# Patient Record
Sex: FEMALE | Race: WHITE | NOT HISPANIC OR LATINO | Employment: UNEMPLOYED | URBAN - METROPOLITAN AREA
[De-identification: names, ages, dates, MRNs, and addresses within clinical notes are randomized per-mention and may not be internally consistent; named-entity substitution may affect disease eponyms.]

---

## 2018-07-11 ENCOUNTER — HOSPITAL ENCOUNTER (EMERGENCY)
Facility: HOSPITAL | Age: 53
Discharge: HOME | End: 2018-07-11
Attending: EMERGENCY MEDICINE
Payer: COMMERCIAL

## 2018-07-11 VITALS
WEIGHT: 192 LBS | HEART RATE: 97 BPM | RESPIRATION RATE: 19 BRPM | SYSTOLIC BLOOD PRESSURE: 111 MMHG | HEIGHT: 66 IN | TEMPERATURE: 98.2 F | DIASTOLIC BLOOD PRESSURE: 70 MMHG | OXYGEN SATURATION: 94 % | BODY MASS INDEX: 30.86 KG/M2

## 2018-07-11 DIAGNOSIS — J44.1 COPD WITH ACUTE EXACERBATION (HCC): Primary | ICD-10-CM

## 2018-07-11 PROCEDURE — 94644 CONT INHLJ TX 1ST HOUR: CPT

## 2018-07-11 PROCEDURE — 94640 AIRWAY INHALATION TREATMENT: CPT

## 2018-07-11 PROCEDURE — 94760 N-INVAS EAR/PLS OXIMETRY 1: CPT

## 2018-07-11 PROCEDURE — 99283 EMERGENCY DEPT VISIT LOW MDM: CPT

## 2018-07-11 RX ORDER — PREDNISONE 20 MG/1
TABLET ORAL
Qty: 24 TABLET | Refills: 0 | Status: SHIPPED | OUTPATIENT
Start: 2018-07-11 | End: 2021-07-21 | Stop reason: SDUPTHER

## 2018-07-11 RX ORDER — ALBUTEROL SULFATE 2.5 MG/3ML
5 SOLUTION RESPIRATORY (INHALATION) ONCE
Status: COMPLETED | OUTPATIENT
Start: 2018-07-11 | End: 2018-07-11

## 2018-07-11 RX ORDER — CETIRIZINE HYDROCHLORIDE 10 MG/1
10 TABLET ORAL DAILY
Qty: 30 TABLET | Refills: 0 | Status: SHIPPED | OUTPATIENT
Start: 2018-07-11 | End: 2020-06-03

## 2018-07-11 RX ORDER — SODIUM CHLORIDE FOR INHALATION 0.9 %
3 VIAL, NEBULIZER (ML) INHALATION ONCE
Status: COMPLETED | OUTPATIENT
Start: 2018-07-11 | End: 2018-07-11

## 2018-07-11 RX ORDER — PREDNISONE 20 MG/1
60 TABLET ORAL ONCE
Status: COMPLETED | OUTPATIENT
Start: 2018-07-11 | End: 2018-07-11

## 2018-07-11 RX ADMIN — ISODIUM CHLORIDE 3 ML: 0.03 SOLUTION RESPIRATORY (INHALATION) at 11:45

## 2018-07-11 RX ADMIN — ALBUTEROL SULFATE 5 MG: 2.5 SOLUTION RESPIRATORY (INHALATION) at 10:55

## 2018-07-11 RX ADMIN — IPRATROPIUM BROMIDE 1 MG: 0.5 SOLUTION RESPIRATORY (INHALATION) at 11:45

## 2018-07-11 RX ADMIN — ALBUTEROL SULFATE 10 MG: 2.5 SOLUTION RESPIRATORY (INHALATION) at 11:45

## 2018-07-11 RX ADMIN — IPRATROPIUM BROMIDE 0.5 MG: 0.5 SOLUTION RESPIRATORY (INHALATION) at 10:55

## 2018-07-11 RX ADMIN — PREDNISONE 60 MG: 20 TABLET ORAL at 11:39

## 2018-07-11 NOTE — DISCHARGE INSTRUCTIONS
COPD (Chronic Obstructive Pulmonary Disease)   WHAT YOU NEED TO KNOW:   Chronic obstructive pulmonary disease (COPD) is a lung disease that makes it hard for you to breathe  It is usually a result of lung damage caused by years of irritation and inflammation in your lungs  DISCHARGE INSTRUCTIONS:   Call 911 if:   · You feel lightheaded, short of breath, and have chest pain  Return to the emergency department if:   · You are confused, dizzy, or feel faint  · Your arm or leg feels warm, tender, and painful  It may look swollen and red  · You cough up blood  Contact your healthcare provider if:   · You have more shortness of breath than usual      · You need more medicine than usual to control your symptoms  · You are coughing or wheezing more than usual      · You are coughing up more mucus, or it is a different color or has a different odor  · You gain more than 3 pounds in a week  · You have a fever, a runny or stuffy nose, and a sore throat, or other cold or flu symptoms  · Your skin, lips, or nails start to turn blue  · You have swelling in your legs or ankles  · You are very tired or weak for more than a day  · You notice changes in your mood, or changes in your ability to think or concentrate  · You have questions or concerns about your condition or care  Medicines:   · Medicines  may be used to open your airways, decrease swelling and inflammation in your lungs, or treat an infection  You may need 2 or more medicines  A short-acting medicine relieves symptoms quickly  Long-acting medicines will control or prevent symptoms  Ask for more information about the medicines you are given and how to use them safely  · Take your medicine as directed  Contact your healthcare provider if you think your medicine is not helping or if you have side effects  Tell him or her if you are allergic to any medicine  Keep a list of the medicines, vitamins, and herbs you take  Include the amounts, and when and why you take them  Bring the list or the pill bottles to follow-up visits  Carry your medicine list with you in case of an emergency  Help make breathing easier:   · Use pursed-lip breathing any time you feel short of breath  Take a deep breath in through your nose  Slowly breathe out through your mouth with your lips pursed for twice as long as you inhaled  You can also practice this breathing pattern while you bend, lift, climb stairs, or exercise  It slows down your breathing and helps move more air in and out of your lungs  · Do not smoke, and avoid others who smoke  Nicotine and other substances can cause lung irritation or damage and make it harder for you to breathe  Do not use e-cigarettes or smokeless tobacco  They still contain nicotine  Ask your healthcare provider for information if you currently smoke and need help to quit  For support and more information:  ¨ [place]  Phone: 9- 903 - [phone]  Web Address: [employer]      · Be aware of and avoid anything that makes your symptoms worse  Stay out of high altitudes and places with high humidity  Stay inside, or cover your mouth and nose with a scarf when you are outside during cold weather  Stay inside on days when air pollution or pollen counts are high  Do not use aerosol sprays such as deodorant, bug spray, and hair spray  Manage COPD and help prevent exacerbations:  COPD is a serious condition that gets worse over time  A COPD exacerbation means your symptoms suddenly get worse  It is important to prevent exacerbations  An exacerbation can cause more lung damage  COPD cannot be cured, but you can take action to feel better and prevent COPD exacerbations:  · Protect yourself from germs  Germs can get into your lungs and cause an infection  An infection in your lungs can create more mucus and make it harder to breathe   An infection can also create swelling in your airways and prevent air from getting in  You can decrease your risk for infection by doing the following:     [facility] your hands often with soap and water  Carry germ-killing gel with you  You can use the gel to clean your hands when soap and water are not available  ¨ Do not touch your eyes, nose, or mouth unless you have washed your hands first      ¨ Always cover your mouth when you cough  Cough into a tissue or your shirtsleeve so you do not spread germs from your hands  ¨ Try to avoid people who have a cold or the flu  If you are sick, stay away from others as much as possible  · Drink more liquids  This will help to keep your air passages moist and help you cough up mucus  Ask how much liquid to drink each day and which liquids are best for you  · Exercise daily  Exercise for at least 20 minutes each day to help increase your energy and decrease shortness of breath  Walking or riding a bike are good ways to exercise  Talk to your healthcare provider about the best exercise plan for you  · Ask about vaccines  Your healthcare provider may recommend that you get regular flu and pneumonia vaccines  Pneumonia can become life-threatening for a person who has COPD  Ask about other vaccines you may need  Ask your healthcare provider about the flu and pneumonia vaccines  All adults should get the flu (influenza) vaccine every year as soon as it becomes available  The pneumonia vaccine is given to adults aged 72 or older to prevent pneumococcal disease, such as pneumonia  Adults aged 23 to 59 years who are at high risk for pneumococcal disease also should get the pneumococcal vaccine  It may need to be repeated 1 or 5 years later  Pulmonary rehabilitation:  Your healthcare provider may recommend a program to help you manage your symptoms and improve your quality of life  It may include nutritional counseling and exercise to strengthen your lungs     Make decisions about your choices for future treatment:  Ask for information about advanced medical directives and living [place]  These documents help you decide and write down your choices for treatment and end-of-life care  It is best to complete them when you feel well and can think clearly about your wishes  The information can then be kept for future use if you are in the hospital or become very ill  Follow up with your healthcare provider as directed: You may need more tests  Your healthcare provider may refer you to a pulmonary (lung) specialist  Write down your questions so you remember to ask them during your visits  © 2017 [facility] Information is for End User's use only and may not be sold, redistributed or otherwise used for commercial purposes  All illustrations and images included in CareNotes® are the copyrighted property of A D A M , Inc  or Gregory Kolb  The above information is an  only  It is not intended as medical advice for individual conditions or treatments  Talk to your doctor, nurse or pharmacist before following any medical regimen to see if it is safe and effective for you

## 2018-07-11 NOTE — ED PROVIDER NOTES
History  Chief Complaint   Patient presents with    URI     Pt c/o URI with cough and clear sputum  Pt is also c/o wheezing     71-year-old female with underlying history of COPD presenting here after visiting urgent care and being referred here  She believes her COPD is most likely exacerbated by the current environment  She is thinking that she may have seasonal allergies that are contributing  She was at urgent care today and was told to come here because we have stronger steroids  Patient is using moderate accessory muscles she is able to speak a few words at a time,  Having moderate severe wheezing        URI   Presenting symptoms: no congestion, no cough, no ear pain, no fatigue, no fever, no rhinorrhea and no sore throat    Associated symptoms: no arthralgias, no headaches, no myalgias and no wheezing        None       Past Medical History:   Diagnosis Date    Bronchitis     COPD (chronic obstructive pulmonary disease) (HCC)     Hyperlipidemia     Hypertension        Past Surgical History:   Procedure Laterality Date    HYSTERECTOMY         History reviewed  No pertinent family history  I have reviewed and agree with the history as documented  Social History   Substance Use Topics    Smoking status: Current Every Day Smoker     Packs/day: 1 00     Types: Cigarettes    Smokeless tobacco: Never Used    Alcohol use Yes      Comment: social        Review of Systems   Constitutional: Negative for activity change, fatigue and fever  HENT: Negative for congestion, ear pain, rhinorrhea and sore throat  Eyes: Negative  Respiratory: Negative for cough, shortness of breath and wheezing  Gastrointestinal: Negative for abdominal pain, diarrhea, nausea and vomiting  Endocrine: Negative  Genitourinary: Negative for difficulty urinating, dyspareunia, dysuria, flank pain, frequency, menstrual problem, pelvic pain, urgency, vaginal bleeding, vaginal discharge and vaginal pain  Musculoskeletal: Negative for arthralgias and myalgias  Skin: Negative for color change and pallor  Neurological: Negative for dizziness, speech difficulty, weakness and headaches  Hematological: Negative for adenopathy  Psychiatric/Behavioral: Negative for confusion  Physical Exam  Physical Exam   Constitutional: She is oriented to person, place, and time  She appears well-developed and well-nourished  She is cooperative  Non-toxic appearance  She does not have a sickly appearance  She does not appear ill  No distress  HENT:   Head: Normocephalic and atraumatic  Right Ear: Tympanic membrane and external ear normal    Left Ear: Tympanic membrane and external ear normal    Nose: No rhinorrhea, sinus tenderness or nasal deformity  No epistaxis  Right sinus exhibits no maxillary sinus tenderness and no frontal sinus tenderness  Left sinus exhibits no maxillary sinus tenderness and no frontal sinus tenderness  Mouth/Throat: Oropharynx is clear and moist and mucous membranes are normal  Normal dentition  Eyes: EOM are normal  Pupils are equal, round, and reactive to light  Neck: Normal range of motion  Neck supple  Cardiovascular: Normal rate, regular rhythm and normal heart sounds  No murmur heard  Pulmonary/Chest: No accessory muscle usage  She is in respiratory distress  She has wheezes  She has no rales  She exhibits no tenderness  Abdominal: Soft  She exhibits no distension  There is no guarding  Musculoskeletal: Normal range of motion  She exhibits no edema or tenderness  Lymphadenopathy:     She has no cervical adenopathy  Neurological: She is alert and oriented to person, place, and time  She exhibits normal muscle tone  Skin: Skin is warm and dry  No rash noted  No erythema  Psychiatric: She has a normal mood and affect  Nursing note and vitals reviewed        Vital Signs  ED Triage Vitals [07/11/18 1044]   Temperature Pulse Respirations Blood Pressure SpO2   98 2 °F (36 8 °C) 92 22 132/82 95 %      Temp Source Heart Rate Source Patient Position - Orthostatic VS BP Location FiO2 (%)   Oral Monitor Sitting Right arm --      Pain Score       No Pain           Vitals:    07/11/18 1044 07/11/18 1225   BP: 132/82 111/70   Pulse: 92 97   Patient Position - Orthostatic VS: Sitting Lying       Visual Acuity      ED Medications  Medications   albuterol inhalation solution 5 mg (5 mg Nebulization Given 7/11/18 1055)   ipratropium (ATROVENT) 0 02 % inhalation solution 0 5 mg (0 5 mg Nebulization Given 7/11/18 1055)   albuterol inhalation solution 10 mg (10 mg Nebulization Given 7/11/18 1145)   ipratropium (ATROVENT) 0 02 % inhalation solution 1 mg (1 mg Nebulization Given 7/11/18 1145)   sodium chloride 0 9 % inhalation solution 3 mL (3 mL Nebulization Given 7/11/18 1145)   predniSONE tablet 60 mg (60 mg Oral Given 7/11/18 1139)       Diagnostic Studies  Results Reviewed     None                 No orders to display              Procedures  Procedures       Phone Contacts  ED Phone Contact    ED Course                               MDM  Number of Diagnoses or Management Options  COPD with acute exacerbation [facility]): new and requires workup  Diagnosis management comments: Patient feels significantly better after hour long breathing treatment  She states she is ready for discharge home  Amount and/or Complexity of Data Reviewed  Independent visualization of images, tracings, or specimens: yes    Patient Progress  Patient progress: improved    CritCare Time    Disposition  Final diagnoses:   COPD with acute exacerbation ([address] )     Time reflects when diagnosis was documented in both MDM as applicable and the Disposition within this note     Time User Action Codes Description Comment    7/11/2018 12:22 PM Omari Hallman Add [J44 1] COPD with acute exacerbation [facility])       ED Disposition     ED Disposition Condition Comment    Discharge  Leida Hutchinson discharge to home/self care      Condition at discharge: Stable        Follow-up Information     Follow up With Specialties Details Why Contact Info       As needed           Discharge Medication List as of 7/11/2018 12:24 PM      START taking these medications    Details   cetirizine (ZyrTEC) 10 mg tablet Take 1 tablet (10 mg total) by mouth daily for 30 days, Starting Wed 7/11/2018, Until Fri 8/10/2018, Print      ipratropium (ATROVENT) 0 02 % nebulizer solution Take 1 vial (0 5 mg total) by nebulization 3 (three) times a day for 10 days, Starting Wed 7/11/2018, Until Sat 7/21/2018, Print      predniSONE 20 mg tablet Take 60 mg by mouth daily ×4 days, 40 mg by mouth daily ×4 days, 20 mg by mouth daily ×4 days, Print           No discharge procedures on file      ED Provider  Electronically Signed by           KELSEY Steve  07/11/18 1180

## 2018-09-26 ENCOUNTER — EVALUATION (OUTPATIENT)
Dept: PHYSICAL THERAPY | Facility: CLINIC | Age: 53
End: 2018-09-26
Payer: COMMERCIAL

## 2018-09-26 DIAGNOSIS — M54.32 LEFT SIDED SCIATICA: Primary | ICD-10-CM

## 2018-09-26 PROCEDURE — 97535 SELF CARE MNGMENT TRAINING: CPT

## 2018-09-26 PROCEDURE — 97035 APP MDLTY 1+ULTRASOUND EA 15: CPT

## 2018-09-26 PROCEDURE — G8978 MOBILITY CURRENT STATUS: HCPCS

## 2018-09-26 PROCEDURE — 97162 PT EVAL MOD COMPLEX 30 MIN: CPT

## 2018-09-26 PROCEDURE — G8979 MOBILITY GOAL STATUS: HCPCS

## 2018-09-26 RX ORDER — MELOXICAM 15 MG/1
15 TABLET ORAL DAILY
COMMUNITY

## 2018-09-26 RX ORDER — CYCLOBENZAPRINE HCL 10 MG
10 TABLET ORAL 3 TIMES DAILY PRN
COMMUNITY

## 2018-09-26 NOTE — LETTER
2018    Emre Ramsey, [address]    Patient: Sweetie Alan   YOB: 1965   Date of Visit: 2018     Encounter Diagnosis     ICD-10-CM    1  Left sided sciatica M54 32        Dear Dr Bueno Ask:    Please review the attached Plan of Care from [address] [place] [address] recent visit  Please verify that you agree therapy should continue by signing the attached document and sending it back to our office  If you have any questions or concerns, please don't hesitate to call  Sincerely,    Cece Lu, PT      Referring Provider:      I certify that I have read the below Plan of Care and certify the need for these services furnished under this plan of treatment while under my care  Emre Ramsey DO  [address] [facility]: [phone]          PT Evaluation     Today's date: 2018  Patient name: Sweetie Alan  : 1965  MRN: [id number]  Referring provider: Adolfo Peralta DO  Dx:   Encounter Diagnosis     ICD-10-CM    1  Left sided sciatica M54 32                   Assessment  Impairments: abnormal gait, abnormal or restricted ROM, activity intolerance, impaired physical strength, lacks appropriate home exercise program and pain with function    Assessment details: Pt presents with new onset left LBP with pain radiating to LLE  Symptoms onset 3 weeks ago after rotatory movement L-spine region  Reports MRI showed severe spinal stenosis and cyst L3-L4-L5  PT notes limited trunk ROM and strength  Abnormal gait noted due to pain with WB LLE  Pt is to see spine surgeon  Pt will benefit from PT tx to eliminate symptoms and restore normal functional level  Prognosis: good    Goals  ST  Decrease pain 50% 4 wk  2  Increase trunk ROM by 25% 4 wk  3  Increase trunk strength to F/F+  4 wk  4  Increase BLE strength to 4 to 4+/5 4 wk  LT  Pt will report no pain 8 wk  2    Increase trunk ROM to WNL 8 wk  3   Increase trunk strength to WNL 8 wk  4  Pt will report no limitations with ADL's 8 wk  5  Pt will report no limitations with ambulation 8 wk    Plan  Patient would benefit from: PT eval  Planned modality interventions: cryotherapy, thermotherapy: hydrocollator packs, ultrasound and unattended electrical stimulation  Planned therapy interventions: joint mobilization, abdominal trunk stabilization, manual therapy, strengthening, stretching, therapeutic activities, therapeutic exercise, home exercise program, functional ROM exercises and flexibility  Frequency: 3x week  Duration in weeks: 8  Treatment plan discussed with: patient        Subjective Evaluation    History of Present Illness  Mechanism of injury: Pt presents with 3 week history from central aspect of L-spine across left low back and radiating down LLE to near ankle region  Pain intermittent  Worst in the morning  Increased pain with steps  Reports turning on couch with "crunching" in low back was onset for symptoms  Had MRI on 18 and reports severe spinal stenosis found along with cyst L3-L4-L5  Has appt with spine surgeon on 10-16-18  Reports no relief with medications  Ice provides minimal relief  Reports hx chronic LBP with multiple injections and Hx PT tx in past     Pain  Current pain ratin  At best pain ratin  At worst pain rating: 10  Location: Left low back and LLE  Quality: sharp    Life stress: Cares for elderly woman          Objective     Postural Observations    Additional Postural Observation Details  Left lateral trunk lean noted noted seated and standing    Tenderness     Lumbar Spine  Tenderness in the spinous process, facet joint, pedicle and left transverse process  Left Hip   Tenderness in the PSIS       Additional Tenderness Details  Tenderness noted bilateral Lumbar paraspinal mm  Left > Right  Increased tenderness noted left SI jt region    Active Range of Motion     Lumbar   Flexion: WFL  Extension: 5 degrees with pain  Left lateral flexion: 8 degrees with pain  Right lateral flexion: 10 degrees with pain    Additional Active Range of Motion Details  Pain to bilateral gluteal region with extension  Increased pain with left lateral flexion  Tightness/pain with rotation bilaterally  Rotation decreased 50% bilaterally     Strength/Myotome Testing     Left Hip   Planes of Motion   Flexion: 4-  Extension: 4-  Abduction: 4-  Adduction: 4-    Right Hip   Planes of Motion   Flexion: 4-  Extension: 4-  Abduction: 4-  Adduction: 4-    Left Knee   Flexion: 4-  Extension: 4    Right Knee   Flexion: 4-  Extension: 4    Left Ankle/Foot   Dorsiflexion: 4  Plantar flexion: 4    Right Ankle/Foot   Dorsiflexion: 4  Plantar flexion: 4    Additional Strength Details  Fair-/Fair seated resisted trunk strength    Tests     Lumbar     Left   Negative passive SLR  Right   Negative passive SLR  Left Pelvic Girdle/Sacrum   Positive: sacrum compression  Right Pelvic Girdle/Sacrum   Positive: sacrum compression  Additional Tests Details  Decreased BLE mm flexibility noted  No symptoms with SLR bilaterally  No change in symptoms with LE traction    Ambulation     Ambulation: Stairs   Ascend stairs: independent  Pattern: non-reciprocal  Descend stairs: independent  Pattern: non-reciprocal    Additional Stairs Ambulation Details  Reports difficulty with steps due to increased pain with WB LLE    Observational Gait   Gait: antalgic   Decreased walking speed, stride length, left stance time and left step length       Additional Observational Gait Details  Left knee in flexed position with gait      Flowsheet Rows      Most Recent Value   PT/OT G-Codes   Current Score  34   Projected Score  59   Assessment Type  Evaluation   G code set  Mobility: Walking & Moving Around   Mobility: Walking and Moving Around Current Status ()  CL   Mobility: Walking and Moving Around Goal Status ()  CK Precautions:  Latex Allergy    Specialty Daily Treatment Diary     Manual  9-26-18       Stretch BLE  LE traction                                            Exercise Diary  9-26-18       nustep        St lateral trunk stretch        St trunk ext        Self HS stretch        Self piriformis stretch        PPT        bridges        abd crunch        LTR        SKTC        MM energy scissors and ABD                                                                                    Modalities 9-26-18       Ultrasound 1 3 w/cm2  10'       E-stim with JAISON

## 2018-09-26 NOTE — PROGRESS NOTES
PT Evaluation     Today's date: 2018  Patient name: Marilee Eric  : 1965  MRN: [id number]  Referring provider: Pantera Nuno DO  Dx:   Encounter Diagnosis     ICD-10-CM    1  Left sided sciatica M54 32                   Assessment  Impairments: abnormal gait, abnormal or restricted ROM, activity intolerance, impaired physical strength, lacks appropriate home exercise program and pain with function    Assessment details: Pt presents with new onset left LBP with pain radiating to LLE  Symptoms onset 3 weeks ago after rotatory movement L-spine region  Reports MRI showed severe spinal stenosis and cyst L3-L4-L5  PT notes limited trunk ROM and strength  Abnormal gait noted due to pain with WB LLE  Pt is to see spine surgeon  Pt will benefit from PT tx to eliminate symptoms and restore normal functional level  Prognosis: good    Goals  ST  Decrease pain 50% 4 wk  2  Increase trunk ROM by 25% 4 wk  3  Increase trunk strength to F/F+  4 wk  4  Increase BLE strength to 4 to 4+/5 4 wk  LT  Pt will report no pain 8 wk  2  Increase trunk ROM to WNL 8 wk  3  Increase trunk strength to WNL 8 wk  4  Pt will report no limitations with ADL's 8 wk  5    Pt will report no limitations with ambulation 8 wk    Plan  Patient would benefit from: PT eval  Planned modality interventions: cryotherapy, thermotherapy: hydrocollator packs, ultrasound and unattended electrical stimulation  Planned therapy interventions: joint mobilization, abdominal trunk stabilization, manual therapy, strengthening, stretching, therapeutic activities, therapeutic exercise, home exercise program, functional ROM exercises and flexibility  Frequency: 3x week  Duration in weeks: 8  Treatment plan discussed with: patient        Subjective Evaluation    History of Present Illness  Mechanism of injury: Pt presents with 3 week history from central aspect of L-spine across left low back and radiating down LLE to near ankle region  Pain intermittent  Worst in the morning  Increased pain with steps  Reports turning on couch with "crunching" in low back was onset for symptoms  Had MRI on 18 and reports severe spinal stenosis found along with cyst L3-L4-L5  Has appt with spine surgeon on 10-16-18  Reports no relief with medications  Ice provides minimal relief  Reports hx chronic LBP with multiple injections and Hx PT tx in past     Pain  Current pain ratin  At best pain ratin  At worst pain rating: 10  Location: Left low back and LLE  Quality: sharp    Life stress: Cares for elderly woman          Objective     Postural Observations    Additional Postural Observation Details  Left lateral trunk lean noted noted seated and standing    Tenderness     Lumbar Spine  Tenderness in the spinous process, facet joint, pedicle and left transverse process  Left Hip   Tenderness in the PSIS       Additional Tenderness Details  Tenderness noted bilateral Lumbar paraspinal mm  Left > Right  Increased tenderness noted left SI jt region    Active Range of Motion     Lumbar   Flexion: WFL  Extension: 5 degrees with pain  Left lateral flexion: 8 degrees with pain  Right lateral flexion: 10 degrees with pain    Additional Active Range of Motion Details  Pain to bilateral gluteal region with extension  Increased pain with left lateral flexion  Tightness/pain with rotation bilaterally  Rotation decreased 50% bilaterally     Strength/Myotome Testing     Left Hip   Planes of Motion   Flexion: 4-  Extension: 4-  Abduction: 4-  Adduction: 4-    Right Hip   Planes of Motion   Flexion: 4-  Extension: 4-  Abduction: 4-  Adduction: 4-    Left Knee   Flexion: 4-  Extension: 4    Right Knee   Flexion: 4-  Extension: 4    Left Ankle/Foot   Dorsiflexion: 4  Plantar flexion: 4    Right Ankle/Foot   Dorsiflexion: 4  Plantar flexion: 4    Additional Strength Details  Fair-/Fair seated resisted trunk strength    Tests     Lumbar     Left   Negative passive SLR  Right   Negative passive SLR  Left Pelvic Girdle/Sacrum   Positive: sacrum compression  Right Pelvic Girdle/Sacrum   Positive: sacrum compression  Additional Tests Details  Decreased BLE mm flexibility noted  No symptoms with SLR bilaterally  No change in symptoms with LE traction    Ambulation     Ambulation: Stairs   Ascend stairs: independent  Pattern: non-reciprocal  Descend stairs: independent  Pattern: non-reciprocal    Additional Stairs Ambulation Details  Reports difficulty with steps due to increased pain with WB LLE    Observational Gait   Gait: antalgic   Decreased walking speed, stride length, left stance time and left step length       Additional Observational Gait Details  Left knee in flexed position with gait      Flowsheet Rows      Most Recent Value   PT/OT G-Codes   Current Score  34   Projected Score  59   Assessment Type  Evaluation   G code set  Mobility: Walking & Moving Around   Mobility: Walking and Moving Around Current Status ()  CL   Mobility: Walking and Moving Around Goal Status ()  CK            Precautions:  Latex Allergy    Specialty Daily Treatment Diary     Manual  9-26-18       Stretch BLE  LE traction                                            Exercise Diary  9-26-18       nustep        St lateral trunk stretch        St trunk ext        Self HS stretch        Self piriformis stretch        PPT        bridges        abd crunch        LTR        SKTC        MM energy scissors and ABD                                                                                    Modalities 9-26-18       Ultrasound 1 3 w/cm2  10'       E-stim with P

## 2018-09-27 ENCOUNTER — OFFICE VISIT (OUTPATIENT)
Dept: PHYSICAL THERAPY | Facility: CLINIC | Age: 53
End: 2018-09-27
Payer: COMMERCIAL

## 2018-09-27 DIAGNOSIS — M54.32 LEFT SIDED SCIATICA: Primary | ICD-10-CM

## 2018-09-27 PROCEDURE — 97140 MANUAL THERAPY 1/> REGIONS: CPT

## 2018-09-27 PROCEDURE — 97110 THERAPEUTIC EXERCISES: CPT

## 2018-09-27 NOTE — PROGRESS NOTES
Daily Note     Today's date: 2018  Patient name: Shyam Figueroa  : 1965  MRN: [id number]  Referring provider: Tri Walker DO  Dx:   Encounter Diagnosis     ICD-10-CM    1  Left sided sciatica M54 32                   Subjective: The patient states that she is doing alright  Reports doing a lot of walking today  Objective: See treatment diary below      Assessment: Tolerated treatment well  Patient exhibited good technique with therapeutic exercises and would benefit from continued PT  Patient did have some tightness in her left LE  Plan: Continue per plan of care       Precautions:  Latex Allergy     Specialty Daily Treatment Diary      Manual  18         Stretch BLE  LE traction    15'                                                                       Exercise Diary  18         nustep    L 1 10'         St lateral trunk stretch             St trunk ext             Self HS stretch             Self piriformis stretch             PPT    30x :05         bridges    20x         abd crunch    20x         LTR    :05 x 20         SKTC    :05 20x         MM energy scissors and ABD    Performed                                                                                                                                             Modalities 18         Ultrasound 1 3 w/cm2  10'  Declined         E-stim with MHP    Declined

## 2018-10-02 ENCOUNTER — OFFICE VISIT (OUTPATIENT)
Dept: PHYSICAL THERAPY | Facility: CLINIC | Age: 53
End: 2018-10-02
Payer: COMMERCIAL

## 2018-10-02 DIAGNOSIS — M54.32 LEFT SIDED SCIATICA: Primary | ICD-10-CM

## 2018-10-02 PROCEDURE — 97035 APP MDLTY 1+ULTRASOUND EA 15: CPT

## 2018-10-02 PROCEDURE — 97110 THERAPEUTIC EXERCISES: CPT

## 2018-10-02 PROCEDURE — 97140 MANUAL THERAPY 1/> REGIONS: CPT

## 2018-10-02 NOTE — PROGRESS NOTES
Daily Note     Today's date: 10/2/2018  Patient name: Maylin Jackson  : 1965  MRN: [id number]  Referring provider: Trell Anderson DO  Dx:   Encounter Diagnosis     ICD-10-CM    1  Left sided sciatica M54 32                   Subjective: The patient states that she is pretty sore today  Pain is a 8/10  Objective: See treatment diary below      Assessment: Tolerated treatment fair  Patient exhibited good technique with therapeutic exercises and would benefit from continued PT  Patient did report feeling better after US was performed  Plan: Continue per plan of care       Precautions:  Latex Allergy     Specialty Daily Treatment Diary      Manual  9-26-18  9/27/18 10/2/18       Stretch BLE  LE traction    15'  15'                                                                     Exercise Diary  9-26-18  9/27/18  10/2/18       nustep    L 1 10'  L 2 10'       St lateral trunk stretch             St trunk ext             Self HS stretch             Self piriformis stretch             PPT    30x :05  30x :05       bridges    20x  30x       abd crunch    20x  30x       LTR    :05 x 20  30x :05       SKTC    :05 20x  30x :05       MM energy scissors and ABD    Performed  Performed                                                                                                                                           Modalities 9-26-18  9/27/18  10/2/18       Ultrasound 1 3 w/cm2  10'  Declined  10'       E-stim with MHP    Declined

## 2018-10-03 ENCOUNTER — OFFICE VISIT (OUTPATIENT)
Dept: PHYSICAL THERAPY | Facility: CLINIC | Age: 53
End: 2018-10-03
Payer: COMMERCIAL

## 2018-10-03 DIAGNOSIS — M54.32 LEFT SIDED SCIATICA: Primary | ICD-10-CM

## 2018-10-03 PROCEDURE — 97140 MANUAL THERAPY 1/> REGIONS: CPT

## 2018-10-03 PROCEDURE — 97035 APP MDLTY 1+ULTRASOUND EA 15: CPT

## 2018-10-03 PROCEDURE — 97110 THERAPEUTIC EXERCISES: CPT

## 2018-10-03 NOTE — PROGRESS NOTES
Daily Note     Today's date: 10/3/2018  Patient name: Andra Rey  : 1965  MRN: [id number]  Referring provider: Maximiliano Guillaume DO  Dx:   Encounter Diagnosis     ICD-10-CM    1  Left sided sciatica M54 32                   Subjective: "Im good"        Objective: See treatment diary below   Pain in 7/10    Assessment: Tolerated treatment well  Patient exhibited good technique with therapeutic exercises    Patient did report some minimal improvement since IE  Plan: Continue per plan of care       recautions:  Latex Allergy     Specialty Daily Treatment Diary      Manual  9-26-18  9/27/18 10/2/18  10/3/18     Stretch BLE  LE traction    15'  15'  15                                                                   Exercise Diary  9-26-18  9/27/18  10/2/18  10/3/18     nustep    L 1 10'  L 2 10' L2 10     St lateral trunk stretch             St trunk ext             Self HS stretch             Self piriformis stretch             PPT    30x :05  30x :05  30  5"     bridges    20x  30x  30     abd crunch    20x  30x  30x     LTR    :05 x 20  30x :05  30x     SKTC    :05 20x  30x :05  30x  5'     MM energy scissors and ABD    Performed  Performed  performed                                                                                                                                         Modalities 9-26-18  9/27/18  10/2/18  10/3/18     Ultrasound 1 3 w/cm2  10'  Declined  10'  10     E-stim with MHP    Declined

## 2018-10-04 ENCOUNTER — APPOINTMENT (OUTPATIENT)
Dept: PHYSICAL THERAPY | Facility: CLINIC | Age: 53
End: 2018-10-04
Payer: COMMERCIAL

## 2018-10-05 ENCOUNTER — OFFICE VISIT (OUTPATIENT)
Dept: PHYSICAL THERAPY | Facility: CLINIC | Age: 53
End: 2018-10-05
Payer: COMMERCIAL

## 2018-10-05 DIAGNOSIS — M54.32 LEFT SIDED SCIATICA: Primary | ICD-10-CM

## 2018-10-05 PROCEDURE — 97035 APP MDLTY 1+ULTRASOUND EA 15: CPT

## 2018-10-05 PROCEDURE — 97110 THERAPEUTIC EXERCISES: CPT

## 2018-10-05 PROCEDURE — 97140 MANUAL THERAPY 1/> REGIONS: CPT

## 2018-10-05 NOTE — PROGRESS NOTES
Daily Note     Today's date: 10/5/2018  Patient name: Fantasma tSark  : 1965  MRN: [id number]  Referring provider: Vik Lester DO  Dx:   Encounter Diagnosis     ICD-10-CM    1  Left sided sciatica M54 32                   Subjective:  I was sore yesterday but am feeling better today      Objective: See treatment diary below      Assessment: Tolerated treatment well  No significant symptoms noted with TE  Improved tolerance to manual therapy noted  MM energy held due to no leg length difference noted  Patient would benefit from continued PT      Plan: Continue per plan of care         recautions:  Latex Allergy     Specialty Daily Treatment Diary      Manual  9-26-18  9/27/18 10/2/18  10/3/18  10-5-18   Stretch BLE  LE traction    15'  15'  15  15'                                                                 Exercise Diary  9-26-18  9/27/18  10/2/18  10/3/18  10-5-18   nustep    L 1 10'  L 2 10' L2 10 L3  12'   St lateral trunk stretch             St trunk ext             Self HS stretch             Self piriformis stretch             PPT    30x :05  30x :05  30  5"  20x  5"   bridges    20x  30x  30  20x   abd crunch    20x  30x  30x  20x   LTR    :05 x 20  30x :05  30x  20x  5"   SKTC    :05 20x  30x :05  30x  5'  20x  5"   MM energy scissors and ABD    Performed  Performed  performed      PPU         10x  5"                                                                                                                         Modalities 9-26-18  9/27/18  10/2/18  10/3/18  10-5-18   Ultrasound 1 3 w/cm2  10'  Declined  10'  10 10'   E-stim with MHP    Declined

## 2018-10-08 ENCOUNTER — OFFICE VISIT (OUTPATIENT)
Dept: PHYSICAL THERAPY | Facility: CLINIC | Age: 53
End: 2018-10-08
Payer: COMMERCIAL

## 2018-10-08 DIAGNOSIS — M54.32 LEFT SIDED SCIATICA: Primary | ICD-10-CM

## 2018-10-08 PROCEDURE — 97035 APP MDLTY 1+ULTRASOUND EA 15: CPT

## 2018-10-08 PROCEDURE — 97110 THERAPEUTIC EXERCISES: CPT

## 2018-10-08 PROCEDURE — 97140 MANUAL THERAPY 1/> REGIONS: CPT

## 2018-10-08 NOTE — PROGRESS NOTES
Daily Note     Today's date: 10/8/2018  Patient name: Goldy Jimenez  : 1965  MRN: [id number]  Referring provider: Chau Altamirano DO  Dx:   Encounter Diagnosis     ICD-10-CM    1  Left sided sciatica M54 32                   Subjective: " I FEEL THE SAME"        Objective: See treatment diary below    Pain in 5/10    Pain out 0/10      Assessment: Tolerated treatment well  Patient exhibited good technique with therapeutic exercises  Patient reports having no pain following previous session but pain returns in AM       P      Plan: Continue per plan of care         Specialty Daily Treatment Diary      Manual  10/8/18  9/27/18 10/2/18  10/3/18  10-5-18   Stretch BLE  LE traction  15  15'  15'  15  15'                                                                 Exercise Diary  10/8/18  9/27/18  10/2/18  10/3/18  10-5-18   nustep  L3 15  L 1 10'  L 2 10' L2 10 L3  12'   St lateral trunk stretch             St trunk ext             Self HS stretch             Self piriformis stretch             PPT  30  30x :05  30x :05  30  5"  20x  5"   bridges 30  20x  30x  30  20x   abd crunch 30  20x  30x  30x  20x   LTR 30  :05 x 20  30x :05  30x  20x  5"   SKTC 30  :05 20x  30x :05  30x  5'  20x  5"   MM energy scissors and ABD  Performed  Performed  Performed  performed      PPU         10x  5"                                                                                                                         Modalities 10/8/18  9/27/18  10/2/18  10/3/18  10-5-18   Ultrasound 1 3 w/cm2  10'  Declined  10'  10 10'   E-stim with MHP    Declined

## 2018-10-10 ENCOUNTER — OFFICE VISIT (OUTPATIENT)
Dept: PHYSICAL THERAPY | Facility: CLINIC | Age: 53
End: 2018-10-10
Payer: COMMERCIAL

## 2018-10-10 DIAGNOSIS — M54.32 LEFT SIDED SCIATICA: Primary | ICD-10-CM

## 2018-10-10 PROCEDURE — 97035 APP MDLTY 1+ULTRASOUND EA 15: CPT

## 2018-10-10 PROCEDURE — 97110 THERAPEUTIC EXERCISES: CPT

## 2018-10-10 PROCEDURE — 97140 MANUAL THERAPY 1/> REGIONS: CPT

## 2018-10-10 NOTE — PROGRESS NOTES
Daily Note     Today's date: 10/10/2018  Patient name: Frances Welch  : 1965  MRN: [id number]  Referring provider: Kianna Rodrigues DO  Dx:   Encounter Diagnosis     ICD-10-CM    1  Left sided sciatica M54 32                   Subjective: "Im good"        Objective: See treatment diary below   Pain iin 4/10      Assessment: Tolerated treatment well  Patient exhibited good technique with therapeutic exercises    Pt reports improved s/s since IE  Patient states pain with stairs is only limitation  Plan: Continue per plan of care         pecialty Daily Treatment Diary      Manual  10/8/18 10/10/18 10/2/18  10/3/18  10-5-18   Stretch BLE  LE traction  15  15'  15'  15  15'                                                                 Exercise Diary  10/8/18 10/10/18  10/2/18  10/3/18  10-5-18   nustep  L3 15  L 3  15  L 2 10' L2 10 L3  12'   St lateral trunk stretch             St trunk ext             Self HS stretch             Self piriformis stretch             PPT  30  30x :05  30x :05  30  5"  20x  5"   bridges 30 30  30x  30  20x   abd crunch 30 30  30x  30x  20x   LTR 30 30  30x :05  30x  20x  5"   SKTC 30 30  30x :05  30x  5'  20x  5"   MM energy scissors and ABD  Performed  Performed  Performed  performed      PPU         10x  5"                                                                                                                         Modalities 10/8/18 10/10/18  10/2/18  10/3/18  10-5-18   Ultrasound 1 3 w/cm2  10' 1 5   wts/cm  10'  10 10'   E-stim with MHP

## 2018-10-11 ENCOUNTER — APPOINTMENT (OUTPATIENT)
Dept: PHYSICAL THERAPY | Facility: CLINIC | Age: 53
End: 2018-10-11
Payer: COMMERCIAL

## 2018-10-12 ENCOUNTER — OFFICE VISIT (OUTPATIENT)
Dept: PHYSICAL THERAPY | Facility: CLINIC | Age: 53
End: 2018-10-12
Payer: COMMERCIAL

## 2018-10-12 DIAGNOSIS — M54.32 LEFT SIDED SCIATICA: Primary | ICD-10-CM

## 2018-10-12 PROCEDURE — 97110 THERAPEUTIC EXERCISES: CPT

## 2018-10-12 PROCEDURE — G8979 MOBILITY GOAL STATUS: HCPCS

## 2018-10-12 PROCEDURE — G8980 MOBILITY D/C STATUS: HCPCS

## 2018-10-12 PROCEDURE — 97140 MANUAL THERAPY 1/> REGIONS: CPT

## 2018-10-12 NOTE — PROGRESS NOTES
Daily Note     Today's date: 10/12/2018  Patient name: Mir Herrera  : 1965  MRN: [id number]  Referring provider: Gagandeep Herring DO  Dx:   Encounter Diagnosis     ICD-10-CM    1  Left sided sciatica M54 32                   Subjective: The patient states that she is a little sore  Objective: See treatment diary below      Assessment: Tolerated treatment well  Patient exhibited good technique with therapeutic exercises and would benefit from continued PT  Patient had tightness in her B LEs  Plan: Continue per plan of care       pecialty Daily Treatment Diary      Manual  10/8/18 10/10/18 10/12/18  10/3/18  10-5-18   Stretch BLE  LE traction  15  15'  15'  15  15'                                                                 Exercise Diary  10/8/18 10/10/18  10/12/18  10/3/18  10-5-18   nustep  L3 15  L 3  15  L 2 10' L2 10 L3  12'   St lateral trunk stretch             St trunk ext             Self HS stretch             Self piriformis stretch             PPT  30  30x :05  30x :05  30  5"  20x  5"   bridges 30 30  30x  30  20x   abd crunch 30 30  30x  30x  20x   LTR 30 30  30x :05  30x  20x  5"   SKTC 30 30  30x :05  30x  5'  20x  5"   MM energy scissors and ABD  Performed  Performed  Performed  performed      PPU      10x 5"   10x  5"   Alt arm/leg     20x                                                                                                               Modalities 10/8/18 10/10/18  10/12/18  10/3/18  10-5-18   Ultrasound 1 3 w/cm2  10' 1 5   wts/cm Declined  10 10'   E-stim with MHP

## 2018-10-15 ENCOUNTER — APPOINTMENT (OUTPATIENT)
Dept: PHYSICAL THERAPY | Facility: CLINIC | Age: 53
End: 2018-10-15
Payer: COMMERCIAL

## 2018-12-10 NOTE — PROGRESS NOTES
PT DISCHARGE     Today's date: 12/10/2018  Patient name: Aixa García  : 1965  MRN: [id number]  Referring provider: Bora Cespedes DO  Dx:   Encounter Diagnosis     ICD-10-CM    1  Left sided sciatica M54 32        Start Time: 0800  Stop Time: 0900  Total time in clinic (min): 60 minutes    Assessment  Assessment details: Pt presented with new onset left LBP with pain radiating to LLE  Symptoms onset 3 weeks prior after rotatory movement L-spine region  Reports MRI showed severe spinal stenosis and cyst L3-L4-L5  PT noted limited trunk ROM and strength  Abnormal gait noted due to pain with WB LLE  Pt seen for approx 3 weeks in PT  She reported continued varied symptoms  Last attended session was on 10-12-18  She did not return after that time  D/C PT services  Impairments: abnormal gait, abnormal or restricted ROM, activity intolerance, impaired physical strength, lacks appropriate home exercise program and pain with function     Prognosis: good    Goals  ST  Decrease pain 50% 4 wk  2  Increase trunk ROM by 25% 4 wk  3  Increase trunk strength to F/F+  4 wk  4  Increase BLE strength to 4 to 4+/5 4 wk  LT  Pt will report no pain 8 wk  2  Increase trunk ROM to WNL 8 wk  3  Increase trunk strength to WNL 8 wk  4  Pt will report no limitations with ADL's 8 wk  5    Pt will report no limitations with ambulation 8 wk    Plan  Plan details: D/C PT services  Findings per initial evaluation   Patient would benefit from: PT eval  Planned modality interventions: cryotherapy, thermotherapy: hydrocollator packs, ultrasound and unattended electrical stimulation  Planned therapy interventions: joint mobilization, abdominal trunk stabilization, manual therapy, strengthening, stretching, therapeutic activities, therapeutic exercise, home exercise program, functional ROM exercises and flexibility        Subjective Evaluation    History of Present Illness  Mechanism of injury: Pt presents with 3 week history from central aspect of L-spine across left low back and radiating down LLE to near ankle region  Pain intermittent  Worst in the morning  Increased pain with steps  Reports turning on couch with "crunching" in low back was onset for symptoms  Had MRI on 18 and reports severe spinal stenosis found along with cyst L3-L4-L5  Has appt with spine surgeon on 10-16-18  Reports no relief with medications  Ice provides minimal relief  Reports hx chronic LBP with multiple injections and Hx PT tx in past     Pain  Current pain ratin  At best pain ratin  At worst pain rating: 10  Location: Left low back and LLE  Quality: sharp    Life stress: Cares for elderly woman          Objective     Postural Observations    Additional Postural Observation Details  Left lateral trunk lean noted noted seated and standing    Tenderness     Lumbar Spine  Tenderness in the spinous process, facet joint, pedicle and left transverse process  Left Hip   Tenderness in the PSIS       Additional Tenderness Details  Tenderness noted bilateral Lumbar paraspinal mm  Left > Right  Increased tenderness noted left SI jt region    Active Range of Motion     Lumbar   Flexion: WFL  Extension: 5 degrees with pain  Left lateral flexion: 8 degrees with pain  Right lateral flexion: 10 degrees with pain    Additional Active Range of Motion Details  Pain to bilateral gluteal region with extension  Increased pain with left lateral flexion  Tightness/pain with rotation bilaterally  Rotation decreased 50% bilaterally     Strength/Myotome Testing     Left Hip   Planes of Motion   Flexion: 4-  Extension: 4-  Abduction: 4-  Adduction: 4-    Right Hip   Planes of Motion   Flexion: 4-  Extension: 4-  Abduction: 4-  Adduction: 4-    Left Knee   Flexion: 4-  Extension: 4    Right Knee   Flexion: 4-  Extension: 4    Left Ankle/Foot   Dorsiflexion: 4  Plantar flexion: 4    Right Ankle/Foot   Dorsiflexion: 4  Plantar flexion: 4    Additional Strength Details  Fair-/Fair seated resisted trunk strength    Tests     Lumbar     Left   Negative passive SLR  Right   Negative passive SLR  Left Pelvic Girdle/Sacrum   Positive: sacrum compression  Right Pelvic Girdle/Sacrum   Positive: sacrum compression  Additional Tests Details  Decreased BLE mm flexibility noted  No symptoms with SLR bilaterally  No change in symptoms with LE traction    Ambulation     Ambulation: Stairs   Ascend stairs: independent  Pattern: non-reciprocal  Descend stairs: independent  Pattern: non-reciprocal    Additional Stairs Ambulation Details  Reports difficulty with steps due to increased pain with WB LLE    Observational Gait   Gait: antalgic   Decreased walking speed, stride length, left stance time and left step length       Additional Observational Gait Details  Left knee in flexed position with gait      Flowsheet Rows      Most Recent Value   PT/OT G-Codes   Assessment Type  Discharge   G code set  Mobility: Walking & Moving Around   Mobility: Walking and Moving Around Goal Status ()  CK   Mobility: Walking and Moving Around Discharge Status ()  CL

## 2020-03-11 ENCOUNTER — TELEPHONE (OUTPATIENT)
Dept: PAIN MEDICINE | Facility: CLINIC | Age: 55
End: 2020-03-11

## 2020-03-11 NOTE — TELEPHONE ENCOUNTER
Pt called to set up pain management appointment  Has [facility] family plan  Requested of her to obtain insurance referral and call back to schedule  Provided fax# [phone]  Back pain, No PM within 2 years

## 2020-06-03 ENCOUNTER — CONSULT (OUTPATIENT)
Dept: PAIN MEDICINE | Facility: CLINIC | Age: 55
End: 2020-06-03
Payer: COMMERCIAL

## 2020-06-03 VITALS
DIASTOLIC BLOOD PRESSURE: 78 MMHG | HEART RATE: 69 BPM | RESPIRATION RATE: 20 BRPM | SYSTOLIC BLOOD PRESSURE: 119 MMHG | HEIGHT: 66 IN | WEIGHT: 202.4 LBS | BODY MASS INDEX: 32.53 KG/M2

## 2020-06-03 DIAGNOSIS — M54.16 LUMBAR RADICULOPATHY: Primary | ICD-10-CM

## 2020-06-03 DIAGNOSIS — M48.061 SPINAL STENOSIS OF LUMBAR REGION WITHOUT NEUROGENIC CLAUDICATION: ICD-10-CM

## 2020-06-03 PROCEDURE — 99244 OFF/OP CNSLTJ NEW/EST MOD 40: CPT | Performed by: ANESTHESIOLOGY

## 2020-06-03 RX ORDER — ALBUTEROL SULFATE 90 UG/1
AEROSOL, METERED RESPIRATORY (INHALATION)
COMMUNITY
Start: 2020-04-02

## 2020-06-03 RX ORDER — OMEPRAZOLE 40 MG/1
40 CAPSULE, DELAYED RELEASE ORAL
COMMUNITY
Start: 2018-08-20

## 2020-06-03 RX ORDER — ATORVASTATIN CALCIUM 40 MG/1
40 TABLET, FILM COATED ORAL
COMMUNITY
Start: 2018-10-12

## 2020-06-03 RX ORDER — ALBUTEROL SULFATE 2.5 MG/3ML
SOLUTION RESPIRATORY (INHALATION)
COMMUNITY
Start: 2020-04-22

## 2020-06-03 RX ORDER — LOSARTAN POTASSIUM AND HYDROCHLOROTHIAZIDE 12.5; 1 MG/1; MG/1
1 TABLET ORAL
COMMUNITY
Start: 2018-07-09

## 2020-06-23 ENCOUNTER — HOSPITAL ENCOUNTER (OUTPATIENT)
Dept: RADIOLOGY | Facility: CLINIC | Age: 55
Discharge: HOME | End: 2020-06-23
Attending: ANESTHESIOLOGY | Admitting: ANESTHESIOLOGY
Payer: COMMERCIAL

## 2020-06-23 VITALS
TEMPERATURE: 97.2 F | RESPIRATION RATE: 20 BRPM | SYSTOLIC BLOOD PRESSURE: 125 MMHG | DIASTOLIC BLOOD PRESSURE: 83 MMHG | OXYGEN SATURATION: 97 % | HEART RATE: 75 BPM

## 2020-06-23 DIAGNOSIS — M54.16 LUMBAR RADICULOPATHY: ICD-10-CM

## 2020-06-23 PROCEDURE — 64483 NJX AA&/STRD TFRM EPI L/S 1: CPT | Performed by: ANESTHESIOLOGY

## 2020-06-23 PROCEDURE — 64484 NJX AA&/STRD TFRM EPI L/S EA: CPT | Performed by: ANESTHESIOLOGY

## 2020-06-23 RX ORDER — PAPAVERINE HCL 150 MG
20 CAPSULE, EXTENDED RELEASE ORAL ONCE
Status: COMPLETED | OUTPATIENT
Start: 2020-06-23 | End: 2020-06-23

## 2020-06-23 RX ORDER — LIDOCAINE HYDROCHLORIDE 10 MG/ML
5 INJECTION, SOLUTION EPIDURAL; INFILTRATION; INTRACAUDAL; PERINEURAL ONCE
Status: COMPLETED | OUTPATIENT
Start: 2020-06-23 | End: 2020-06-23

## 2020-06-23 RX ORDER — BUPIVACAINE HCL/PF 2.5 MG/ML
5 VIAL (ML) INJECTION ONCE
Status: COMPLETED | OUTPATIENT
Start: 2020-06-23 | End: 2020-06-23

## 2020-06-23 RX ADMIN — IOHEXOL 2 ML: 300 INJECTION, SOLUTION INTRAVENOUS at 08:24

## 2020-06-23 RX ADMIN — Medication 2 ML: at 08:23

## 2020-06-23 RX ADMIN — DEXAMETHASONE SODIUM PHOSPHATE 20 MG: 10 INJECTION, SOLUTION INTRAMUSCULAR; INTRAVENOUS at 08:24

## 2020-06-23 RX ADMIN — LIDOCAINE HYDROCHLORIDE 3 ML: 10 INJECTION, SOLUTION EPIDURAL; INFILTRATION; INTRACAUDAL; PERINEURAL at 08:22

## 2020-06-29 ENCOUNTER — TELEPHONE (OUTPATIENT)
Dept: PAIN MEDICINE | Facility: CLINIC | Age: 55
End: 2020-06-29

## 2020-06-29 NOTE — TELEPHONE ENCOUNTER
Patient   [phone]  Dr Surjit Elder     Patient is calling in requesting a call back  She said that she is in more pain now   Her pain level is a 8/10

## 2020-06-29 NOTE — TELEPHONE ENCOUNTER
S/w pt  S/p TFESI 6/23  C/o mid to upper back and neck pain  Advised pt that TFESI procedure would not have helped neck pain  Pt states lower back and right leg pain still present but improved  Advised pt to give injection 2 weeks for full benefit and SPA will cb in regards to neck step in treating neck pain  please advise

## 2020-07-01 ENCOUNTER — APPOINTMENT (OUTPATIENT)
Dept: RADIOLOGY | Facility: CLINIC | Age: 55
End: 2020-07-01
Payer: COMMERCIAL

## 2020-07-01 ENCOUNTER — OFFICE VISIT (OUTPATIENT)
Dept: PAIN MEDICINE | Facility: CLINIC | Age: 55
End: 2020-07-01
Payer: COMMERCIAL

## 2020-07-01 VITALS
BODY MASS INDEX: 32.47 KG/M2 | SYSTOLIC BLOOD PRESSURE: 96 MMHG | WEIGHT: 202 LBS | DIASTOLIC BLOOD PRESSURE: 67 MMHG | HEART RATE: 88 BPM | RESPIRATION RATE: 18 BRPM | HEIGHT: 66 IN

## 2020-07-01 DIAGNOSIS — M50.120 CERVICAL DISC DISORDER WITH RADICULOPATHY OF MID-CERVICAL REGION: ICD-10-CM

## 2020-07-01 DIAGNOSIS — M50.120 CERVICAL DISC DISORDER WITH RADICULOPATHY OF MID-CERVICAL REGION: Primary | ICD-10-CM

## 2020-07-01 PROCEDURE — 99214 OFFICE O/P EST MOD 30 MIN: CPT | Performed by: ANESTHESIOLOGY

## 2020-07-01 PROCEDURE — 72050 X-RAY EXAM NECK SPINE 4/5VWS: CPT

## 2020-07-01 NOTE — PATIENT INSTRUCTIONS
Neck Exercises   AMBULATORY CARE:   Neck exercises  help reduce neck pain, and improve neck movement and strength  Neck exercises also help prevent long-term neck problems  What you need to know about neck exercises:   · Do the exercises every day,  or as often as directed by your healthcare provider  · Move slowly, gently, and smoothly  Avoid fast or jerky motions  · Stand and sit the way your healthcare provider shows you  Good posture may reduce your neck pain  Check your posture often, even when you are not doing your neck exercises  How to perform neck exercises safely:   · Exercise position:  You may sit or stand while you do neck exercises  Face forward  Your shoulders should be straight and relaxed, with a good posture  · Head tilts, forward and back:  Gently bow your head and try to touch your chin to your chest  Your healthcare provider may tell you to push on the back of your neck to help bow your head  Raise your chin back to the starting position  Tilt your head back as far as possible so you are looking up at the ceiling  Your healthcare provider may tell you to lift your chin to help tilt your head back  Return your head to the starting position  · Head tilts, side to side:  Tilt your head, bringing your ear toward your shoulder  Then tilt your head toward the other shoulder  · Head turns:  Turn your head to look over your shoulder  Tilt your chin down and try to touch it to your shoulder  Do not raise your shoulder to your chin  Face forward again  Do the same on the other side  · Head rolls:  Slowly bring your chin toward your chest  Next, roll your head to the right  Your ear should be positioned over your shoulder  Hold this position for 5 seconds  Roll your head back toward your chest and to the left into the same position  Hold for 5 seconds  Gently roll your head back and around in a clockwise [place] 3 times   Next, move your head in the reverse direction (counterclockwise) in a [place] 3 times  Do not shrug your shoulders upwards while you do this exercise  Contact your healthcare provider if:   · Your pain does not get better, or gets worse  · You have questions or concerns about your condition, care, or exercise program   © 2017 [address] [employer] Information is for End User's use only and may not be sold, redistributed or otherwise used for commercial purposes  All illustrations and images included in CareNotes® are the copyrighted property of [employer]  or Gregory Kolb  The above information is an  only  It is not intended as medical advice for individual conditions or treatments  Talk to your doctor, nurse or pharmacist before following any medical regimen to see if it is safe and effective for you

## 2020-07-01 NOTE — PROGRESS NOTES
Assessment:  1  Cervical disc disorder with radiculopathy of mid-cervical region  Ambulatory referral to Physical Therapy    X-ray cervical spine complete 4 or 5 vw       Plan: This is a 42-year-old female who presents today with neck pain  On physical examination, there is no gross motor deficits appreciated  Sensory testing is intact  No recent imaging of the neck  No recent physical therapy  At this time, I will send her to physical therapy 2-3 times a week x6 weeks duration  Upon completion, patient will present back in the office for reassessment  We may consider trial order imaging study, MRI of the cervical spine a for her neck pain persist   Patient may benefit from a trial of pain intervention  She verbalized understanding  My impressions and treatment recommendations were discussed in detail with the patient who verbalized understanding and had no further questions  Discharge instructions were provided  I personally saw and examined the patient and I agree with the above discussed plan of care  History of Present Illness  Dennis Holley is a 54 y o  female who presents for a follow up office visit in regards to Neck Pain  The patients current symptoms include neck pain  Of note, patient had a left L4, L5 TFESI approximately a week and half ago  Today, patient reports pain which she rates 7/10  No recent imaging of the neck  No recent physical therapy for her neck pain symptoms  I have personally reviewed and/or updated the patient's past medical history, past surgical history, family history, social history, current medications, allergies, and vital signs today  Review of Systems   Constitutional: Negative for fever and unexpected weight change  HENT: Negative for trouble swallowing  Eyes: Negative for visual disturbance  Respiratory: Negative for shortness of breath and wheezing  Cardiovascular: Negative for chest pain and palpitations     Gastrointestinal: Negative for constipation, diarrhea, nausea and vomiting  Endocrine: Negative for cold intolerance, heat intolerance and polydipsia  Genitourinary: Negative for difficulty urinating and frequency  Musculoskeletal: Positive for arthralgias, myalgias, neck pain and neck stiffness  Negative for gait problem and joint swelling  Skin: Negative for rash  Neurological: Positive for numbness  Negative for dizziness, seizures, syncope, weakness and headaches  Hematological: Does not bruise/bleed easily  Psychiatric/Behavioral: Negative for dysphoric mood  All other systems reviewed and are negative  Patient Active Problem List   Diagnosis    Lumbar radiculopathy - Right    Spinal stenosis of lumbar region without neurogenic claudication       Past Medical History:   Diagnosis Date    Bronchitis     COPD [facility]ch[facility] obstructive [facility] disease) ([facility] [address] [facility]     Hyperlipidemia     Hypertension        Past Surgical History:   Procedure Laterality Date    CHOLECYSTECTOMY      HERNIA REPAIR      HYSTERECTOMY         History reviewed  No pertinent family history  Social History     Occupational History    Not on file   Tobacco Use    Smoking status: Current Every Day Smoker     Packs/day: 1 00     Types: Cigarettes    Smokeless tobacco: Never Used   Substance and Sexual Activity    Alcohol use: Yes     Comment: social    Drug use: No    Sexual activity: Not on file       Current Outpatient Medications on File Prior to Visit   Medication Sig    albuterol (2 5 mg/3 mL) 0 083 % nebulizer solution USE EVERY 4-6 HOURS AS NEEDED    albuterol (PROVENTIL HFA,VENTOLIN HFA) 90 mcg/act inhaler INHALE 2 PUFFS BY MOUTH EVERY 4 HOURS AS NEEDED FOR WHEEZING      atorvastatin (LIPITOR) 40 mg tablet Take 40 mg by mouth    losartan-hydrochlorothiazide (HYZAAR) 100-12 5 MG per tablet Take 1 tablet by mouth    omeprazole (PriLOSEC) 40 MG capsule Take 40 mg by mouth    cyclobenzaprine (FLEXERIL) 10 mg tablet Take 10 mg by mouth 3 (three) times a day as needed for muscle spasms    ipratropium (ATROVENT) 0 02 % nebulizer solution Take 1 vial (0 5 mg total) by nebulization 3 (three) times a day for 10 days    meloxicam (MOBIC) 15 mg tablet Take 15 mg by mouth daily    predniSONE 20 mg tablet Take 60 mg by mouth daily ×4 days, 40 mg by mouth daily ×4 days, 20 mg by mouth daily ×4 days (Patient not taking: Reported on 7/1/2020)     No current facility-administered medications on file prior to visit  Allergies   Allergen Reactions    Latex Swelling    Cephalexin Other (See Comments)     dizzy    Hydrocodone-Acetaminophen Hives    Moxifloxacin Hcl In Nacl Hives and Other (See Comments)     Hives and SOB   Oxycodone-Acetaminophen Hives    Quinolones Hives       Physical Exam:    BP 96/67   Pulse 88   Resp 18   Ht 5' 6" (1 676 m)   Wt 91 6 kg (202 lb)   BMI 32 60 kg/m²     Constitutional:normal, well developed, well nourished, alert, in no distress and non-toxic and no overt pain behavior    Eyes:anicteric  HEENT:grossly intact  Neck:supple, symmetric, trachea midline and no masses   Pulmonary:even and unlabored  Cardiovascular:No edema or pitting edema present  Skin:Normal without rashes or lesions and well hydrated  Psychiatric:Mood and affect appropriate  Neurologic:Cranial Nerves II-XII grossly intact  Musculoskeletal:normal    Imaging

## 2021-07-21 ENCOUNTER — OFFICE VISIT (OUTPATIENT)
Dept: URGENT CARE | Facility: CLINIC | Age: 56
End: 2021-07-21
Payer: COMMERCIAL

## 2021-07-21 VITALS
DIASTOLIC BLOOD PRESSURE: 62 MMHG | RESPIRATION RATE: 16 BRPM | OXYGEN SATURATION: 97 % | HEART RATE: 88 BPM | SYSTOLIC BLOOD PRESSURE: 102 MMHG | HEIGHT: 66 IN | BODY MASS INDEX: 31.82 KG/M2 | WEIGHT: 198 LBS | TEMPERATURE: 98.1 F

## 2021-07-21 DIAGNOSIS — K59.00 CONSTIPATION, UNSPECIFIED CONSTIPATION TYPE: ICD-10-CM

## 2021-07-21 DIAGNOSIS — L50.9 URTICARIA: Primary | ICD-10-CM

## 2021-07-21 PROCEDURE — S9088 SERVICES PROVIDED IN URGENT: HCPCS | Performed by: PHYSICIAN ASSISTANT

## 2021-07-21 PROCEDURE — 99214 OFFICE O/P EST MOD 30 MIN: CPT | Performed by: PHYSICIAN ASSISTANT

## 2021-07-21 RX ORDER — PREDNISONE 20 MG/1
TABLET ORAL
Qty: 24 TABLET | Refills: 0 | Status: SHIPPED | OUTPATIENT
Start: 2021-07-21

## 2021-07-21 NOTE — PATIENT INSTRUCTIONS
Pred for the hives  Laxative for the constipation  ER if you get any of the symptoms such as increased pain, inability to pass gas     Constipation   WHAT YOU NEED TO KNOW:   Constipation means you have hard, dry bowel movements, or you go longer than usual between bowel movements  DISCHARGE INSTRUCTIONS:   Call your doctor if:   · You have blood in your bowel movements  · You have a fever and abdominal pain with the constipation  · Your constipation gets worse  · You start to vomit  · You have questions or concerns about your condition or care  Medicines:   · Medicine  such as a laxative may help relax and loosen your intestines to help you have a bowel movement  Your provider may recommend you only use laxatives for a short time  Long-term use may make your bowels dependent on the medicine  · Take your medicine as directed  Contact your healthcare provider if you think your medicine is not helping or if you have side effects  Tell him of her if you are allergic to any medicine  Keep a list of the medicines, vitamins, and herbs you take  Include the amounts, and when and why you take them  Bring the list or the pill bottles to follow-up visits  Carry your medicine list with you in case of an emergency  Relieve constipation:   · A suppository  may be used to help soften your bowel movements  This may make them easier to pass  A suppository is guided into your rectum through your anus  · An enema  is liquid medicine used to clear bowel movement from your rectum  The medicine is put into your rectum through your anus  Prevent constipation:   · Drink liquids as directed  You may need to drink extra liquids to help soften and move your bowels  Ask how much liquid to drink each day and which liquids are best for you  · Eat high-fiber foods  This may help decrease constipation by adding bulk to your bowel movements   High-fiber foods include fruit, vegetables, whole-grain breads and cereals, and beans  Your healthcare provider or dietitian can help you create a high-fiber meal plan  Your provider may also recommend a fiber supplement if you cannot get enough fiber from food  · Exercise regularly  Regular physical activity can help stimulate your intestines  Walking is a good exercise to prevent or relieve constipation  Ask which exercises are best for you  · Schedule a time each day to have a bowel movement  This may help train your body to have regular bowel movements  Bend forward while you are on the toilet to help move the bowel movement out  Sit on the toilet for at least 10 minutes, even if you do not have a bowel movement  · Talk to your healthcare provider about your medicines  Certain medicines, such as opioids, can cause constipation  Your provider may be able to make medicine changes  For example, he or she may change the kind of medicine, or change when you take it  Follow up with your healthcare provider as directed:  Write down your questions so you remember to ask them during your visits  © Copyright [employer] 2021 Information is for End User's use only and may not be sold, redistributed or otherwise used for commercial purposes  All illustrations and images included in CareNotes® are the copyrighted property of [employer]  or Vanessa Calle   The above information is an  only  It is not intended as medical advice for individual conditions or treatments  Talk to your doctor, nurse or pharmacist before following any medical regimen to see if it is safe and effective for you

## 2021-07-21 NOTE — PROGRESS NOTES
[facility]        NAME: Herbert Singh is a 64 y o  female  : 1965    MRN: [id number]  DATE: 2021  TIME: 9:10 AM    Assessment and Plan   Urticaria [L50 9]  1  Urticaria  predniSONE 20 mg tablet   2  Constipation, unspecified constipation type  methylcellulose (CITRUCEL) 500 mg tablet     Pt appears well   She is passing gas and still has had small formed stools during the last 6 days   Pt aware of signs of obstruction and will go to ER if worse  Patient Instructions   Patient Instructions       Pred for the hives  Laxative for the constipation  ER if you get any of the symptoms such as increased pain, inability to pass gas     Constipation   WHAT YOU NEED TO KNOW:   Constipation means you have hard, dry bowel movements, or you go longer than usual between bowel movements  DISCHARGE INSTRUCTIONS:   Call your doctor if:   · You have blood in your bowel movements  · You have a fever and abdominal pain with the constipation  · Your constipation gets worse  · You start to vomit  · You have questions or concerns about your condition or care  Medicines:   · Medicine  such as a laxative may help relax and loosen your intestines to help you have a bowel movement  Your provider may recommend you only use laxatives for a short time  Long-term use may make your bowels dependent on the medicine  · Take your medicine as directed  Contact your healthcare provider if you think your medicine is not helping or if you have side effects  Tell him of her if you are allergic to any medicine  Keep a list of the medicines, vitamins, and herbs you take  Include the amounts, and when and why you take them  Bring the list or the pill bottles to follow-up visits  Carry your medicine list with you in case of an emergency  Relieve constipation:   · A suppository  may be used to help soften your bowel movements  This may make them easier to pass   A suppository is guided into your rectum through your anus  · An enema  is liquid medicine used to clear bowel movement from your rectum  The medicine is put into your rectum through your anus  Prevent constipation:   · Drink liquids as directed  You may need to drink extra liquids to help soften and move your bowels  Ask how much liquid to drink each day and which liquids are best for you  · Eat high-fiber foods  This may help decrease constipation by adding bulk to your bowel movements  High-fiber foods include fruit, vegetables, whole-grain breads and cereals, and beans  Your healthcare provider or dietitian can help you create a high-fiber meal plan  Your provider may also recommend a fiber supplement if you cannot get enough fiber from food  · Exercise regularly  Regular physical activity can help stimulate your intestines  Walking is a good exercise to prevent or relieve constipation  Ask which exercises are best for you  · Schedule a time each day to have a bowel movement  This may help train your body to have regular bowel movements  Bend forward while you are on the toilet to help move the bowel movement out  Sit on the toilet for at least 10 minutes, even if you do not have a bowel movement  · Talk to your healthcare provider about your medicines  Certain medicines, such as opioids, can cause constipation  Your provider may be able to make medicine changes  For example, he or she may change the kind of medicine, or change when you take it  Follow up with your healthcare provider as directed:  Write down your questions so you remember to ask them during your visits  © Copyright [email or web address] 2021 Information is for End User's use only and may not be sold, redistributed or otherwise used for commercial purposes  All illustrations and images included in CareNotes® are the copyrighted property of [employer]  or Vanessa V[employer]carra  The above information is an  only   It is not intended as medical advice for individual conditions or treatments  Talk to your doctor, nurse or pharmacist before following any medical regimen to see if it is safe and effective for you  Follow up with PCP in 3-5 days  Proceed to  ER if symptoms worsen  Chief Complaint     Chief Complaint   Patient presents with    Urticaria     small red hives on b/l neck, also has swollen eye lids and very slightly red forehead  Pt reports very itchy  Started after 9pm last night  Unknown allergen  Took 2 benadryl at 2am      Constipation     pt reports no normal BM in 6 days  Only had a small amount come out  Past 2 days took colace  Has some lower abdominal pain/cramping  History of Present Illness       The patient is a 59-year-old female presenting today for the urticaria  She has small red hives under neck  Her eyelids are swollen  She has slight erythema of the forehead  She reports that it is very itchy  She took 2 Benadryl at 2:00 a m  Jerone Willian The hives began at 9:00 p m  last night  She cannot think of anything that caused  In addition the patient has constipation for 6 days  She has been taking Colace for 2 days been having some abdominal pain/cramping  Review of Systems   Review of Systems   Constitutional: Negative for activity change, appetite change, chills, fatigue and fever  HENT: Negative for congestion, rhinorrhea, sinus pressure, sinus pain and sore throat  Respiratory: Negative for cough, chest tightness and shortness of breath  Cardiovascular: Negative for chest pain and palpitations  Gastrointestinal: Positive for constipation  Negative for abdominal pain, diarrhea, nausea and vomiting  Musculoskeletal: Negative for arthralgias and myalgias  Skin: Positive for color change (uticaria)  Negative for pallor  Neurological: Negative for headaches           Current Medications       Current Outpatient Medications:     albuterol (2 5 mg/3 mL) 0 083 % nebulizer solution, USE EVERY 4-6 HOURS AS NEEDED, Disp: , Rfl:     albuterol (PROVENTIL HFA,VENTOLIN HFA) 90 mcg/act inhaler, INHALE 2 PUFFS BY MOUTH EVERY 4 HOURS AS NEEDED FOR WHEEZING , Disp: , Rfl:     atorvastatin (LIPITOR) 40 mg tablet, Take 40 mg by mouth, Disp: , Rfl:     ipratropium (ATROVENT) 0 02 % nebulizer solution, Take 1 vial (0 5 mg total) by nebulization 3 (three) times a day for 10 days, Disp: 75 mL, Rfl: 0    losartan-hydrochlorothiazide (HYZAAR) 100-12 5 MG per tablet, Take 1 tablet by mouth, Disp: , Rfl:     omeprazole (PriLOSEC) 40 MG capsule, Take 40 mg by mouth, Disp: , Rfl:     cyclobenzaprine (FLEXERIL) 10 mg tablet, Take 10 mg by mouth 3 (three) times a day as needed for muscle spasms, Disp: , Rfl:     meloxicam (MOBIC) 15 mg tablet, Take 15 mg by mouth daily, Disp: , Rfl:     methylcellulose (CITRUCEL) 500 mg tablet, Take 2 tablets (1,000 mg total) by mouth daily, Disp: 14 tablet, Rfl: 0    predniSONE 20 mg tablet, Take 60 mg by mouth daily ×4 days, 40 mg by mouth daily ×4 days, 20 mg by mouth daily ×4 days, Disp: 24 tablet, Rfl: 0    Current Allergies     Allergies as of 07/21/2021 - Reviewed 07/21/2021   Allergen Reaction Noted    Latex Swelling 07/21/2011    Cephalexin Other (See Comments) 10/06/2008    Hydrocodone-acetaminophen Hives 06/17/2016    Moxifloxacin hcl in nacl Hives and Other (See Comments) 01/03/2013    Oxycodone-acetaminophen Hives 10/06/2008    Quinolones Hives 10/06/2008            The following portions of the patient's history were reviewed and updated as appropriate: allergies, current medications, past family history, past medical history, past social history, past surgical history and problem list      Past Medical History:   Diagnosis Date    Bronchitis     COPD [facility]chronic obstructive pulmonary disease) ([facility] [address]     Hyperlipidemia     Hypertension        Past Surgical History:   Procedure Laterality Date    CHOLECYSTECTOMY      HERNIA REPAIR      HYSTERECTOMY History reviewed  No pertinent family history  Medications have been verified  Objective   /62   Pulse 88   Temp 98 1 °F (36 7 °C) (Temporal)   Resp 16   Ht 5' 6" (1 676 m)   Wt 89 8 kg (198 lb)   SpO2 97%   BMI 31 96 kg/m²        Physical Exam     Physical Exam  Vitals reviewed  Constitutional:       General: She is not in acute distress  Appearance: Normal appearance  She is normal weight  She is not ill-appearing, toxic-appearing or diaphoretic  HENT:      Head: Normocephalic and atraumatic  Cardiovascular:      Rate and Rhythm: Normal rate and regular rhythm  Heart sounds: Normal heart sounds  No murmur heard  No friction rub  No gallop  Pulmonary:      Effort: Pulmonary effort is normal  No respiratory distress  Breath sounds: Normal breath sounds  No stridor  No wheezing, rhonchi or rales  Chest:      Chest wall: No tenderness  Abdominal:      General: Abdomen is flat  Bowel sounds are normal  There is no distension  Palpations: Abdomen is soft  Tenderness: There is abdominal tenderness (LLQ)  There is no guarding  Musculoskeletal:         General: Normal range of motion  Skin:     General: Skin is warm and dry  Capillary Refill: Capillary refill takes less than 2 seconds  Comments: Hives on the face/ neck  Eyes are swollen and puffy   Neurological:      Mental Status: She is alert